# Patient Record
Sex: MALE | Race: WHITE | Employment: FULL TIME | URBAN - METROPOLITAN AREA
[De-identification: names, ages, dates, MRNs, and addresses within clinical notes are randomized per-mention and may not be internally consistent; named-entity substitution may affect disease eponyms.]

---

## 2019-06-21 ENCOUNTER — HOSPITAL ENCOUNTER (EMERGENCY)
Age: 24
Discharge: HOME | End: 2019-06-21
Attending: EMERGENCY MEDICINE
Payer: COMMERCIAL

## 2019-06-21 VITALS
SYSTOLIC BLOOD PRESSURE: 155 MMHG | TEMPERATURE: 98.2 F | OXYGEN SATURATION: 99 % | RESPIRATION RATE: 16 BRPM | BODY MASS INDEX: 29.55 KG/M2 | WEIGHT: 195 LBS | HEIGHT: 68 IN | DIASTOLIC BLOOD PRESSURE: 100 MMHG | HEART RATE: 78 BPM

## 2019-06-21 DIAGNOSIS — H18.891 CORNEAL IRRITATION OF RIGHT EYE: Primary | ICD-10-CM

## 2019-06-21 PROCEDURE — 2580000003 HC RX 258: Performed by: PHYSICIAN ASSISTANT

## 2019-06-21 PROCEDURE — 6370000000 HC RX 637 (ALT 250 FOR IP): Performed by: PHYSICIAN ASSISTANT

## 2019-06-21 PROCEDURE — 99282 EMERGENCY DEPT VISIT SF MDM: CPT

## 2019-06-21 RX ORDER — HYDROCODONE BITARTRATE AND ACETAMINOPHEN 5; 325 MG/1; MG/1
1 TABLET ORAL ONCE
Status: COMPLETED | OUTPATIENT
Start: 2019-06-21 | End: 2019-06-21

## 2019-06-21 RX ORDER — ERYTHROMYCIN 5 MG/G
OINTMENT OPHTHALMIC ONCE
Status: COMPLETED | OUTPATIENT
Start: 2019-06-21 | End: 2019-06-21

## 2019-06-21 RX ORDER — 0.9 % SODIUM CHLORIDE 0.9 %
250 INTRAVENOUS SOLUTION INTRAVENOUS ONCE
Status: COMPLETED | OUTPATIENT
Start: 2019-06-21 | End: 2019-06-21

## 2019-06-21 RX ORDER — TOBRAMYCIN 3 MG/ML
1 SOLUTION/ DROPS OPHTHALMIC ONCE
Status: DISCONTINUED | OUTPATIENT
Start: 2019-06-21 | End: 2019-06-21

## 2019-06-21 RX ORDER — HYDROCODONE BITARTRATE AND ACETAMINOPHEN 5; 325 MG/1; MG/1
1 TABLET ORAL EVERY 8 HOURS PRN
Qty: 6 TABLET | Refills: 0 | Status: SHIPPED | OUTPATIENT
Start: 2019-06-21 | End: 2019-06-23

## 2019-06-21 RX ORDER — TOBRAMYCIN 3 MG/ML
1 SOLUTION/ DROPS OPHTHALMIC ONCE
Status: COMPLETED | OUTPATIENT
Start: 2019-06-21 | End: 2019-06-21

## 2019-06-21 RX ORDER — TETRACAINE HYDROCHLORIDE 5 MG/ML
1 SOLUTION OPHTHALMIC ONCE
Status: COMPLETED | OUTPATIENT
Start: 2019-06-21 | End: 2019-06-21

## 2019-06-21 RX ADMIN — HYDROCODONE BITARTRATE AND ACETAMINOPHEN 1 TABLET: 5; 325 TABLET ORAL at 20:39

## 2019-06-21 RX ADMIN — HYDROCODONE BITARTRATE AND ACETAMINOPHEN 1 TABLET: 5; 325 TABLET ORAL at 21:04

## 2019-06-21 RX ADMIN — TOBRAMYCIN 1 DROP: 3 SOLUTION/ DROPS OPHTHALMIC at 20:45

## 2019-06-21 RX ADMIN — ERYTHROMYCIN: 5 OINTMENT OPHTHALMIC at 20:45

## 2019-06-21 RX ADMIN — FLUORESCEIN SODIUM 1 MG: 0.6 STRIP OPHTHALMIC at 20:09

## 2019-06-21 RX ADMIN — SODIUM CHLORIDE 250 ML: 9 INJECTION, SOLUTION INTRAVENOUS at 20:50

## 2019-06-21 RX ADMIN — TETRACAINE HYDROCHLORIDE 1 DROP: 5 SOLUTION OPHTHALMIC at 20:08

## 2019-06-21 ASSESSMENT — PAIN SCALES - GENERAL
PAINLEVEL_OUTOF10: 8
PAINLEVEL_OUTOF10: 6
PAINLEVEL_OUTOF10: 8
PAINLEVEL_OUTOF10: 6

## 2019-06-21 ASSESSMENT — PAIN DESCRIPTION - ORIENTATION: ORIENTATION: RIGHT

## 2019-06-21 ASSESSMENT — ENCOUNTER SYMPTOMS
SHORTNESS OF BREATH: 0
SORE THROAT: 0
VOMITING: 0
BACK PAIN: 0
ABDOMINAL PAIN: 0
EYE PAIN: 1
EYE REDNESS: 1
COUGH: 0
NAUSEA: 0

## 2019-06-21 ASSESSMENT — PAIN DESCRIPTION - FREQUENCY: FREQUENCY: CONTINUOUS

## 2019-06-21 ASSESSMENT — PAIN DESCRIPTION - LOCATION: LOCATION: EYE

## 2019-06-21 ASSESSMENT — PAIN DESCRIPTION - PAIN TYPE: TYPE: ACUTE PAIN

## 2019-06-21 ASSESSMENT — PAIN DESCRIPTION - DESCRIPTORS: DESCRIPTORS: BURNING

## 2019-06-21 NOTE — ED PROVIDER NOTES
[facility]  [address] [place] [address]  Phone: [phone]  Fax: [phone]      Pt Name: Kaylene Woods  MRN: [id number]  Concha 1995  Date of evaluation: 6/21/2019      CHIEF COMPLAINT       Chief Complaint   Patient presents with    Eye Injury     Injured / scratched right eye while removing his contact       HISTORY OF PRESENT ILLNESS   (Location, Quality, Severity, Duration, Timing, Context, ModifyingFactors, Associated Signs and Symptoms)     Kaylene Woods is a 24 y.o. male who presents to the ER with his father for evaluation of right eye irritation. Patient states that he wears contact lenses. He put in his contact this morning around 10 AM.  He states that throughout the day the eye became gradually more irritated. He ended up removing his contact around 2 PM.  He states that his eye felt very dry and was red. Patient presented to greatly to urgent care and was evaluated. He was diagnosed with allergies and placed on a combination antibiotic/steroid eyedrop. Patient continued to have worsening discomfort so he presented to a second urgent care ([facility] urgent [facility]). Patient states that the eye was numbed and stained. He was diagnosed with a severe corneal abrasion and sent to the ER for further evaluation and treatment. Patient was given the bottle of tetracaine to take with him. The patient and his father were under the impression that they would be calling our facility to inform them that he was coming. Patient states that the right eye has been watery. His vision is blurry. Patient rates his acute right eye pain at 8/10. Patient is established with an optometrist.    Nursing Notes were reviewed. REVIEW OF SYSTEMS     (2-9 systems for level 4, 10 or more for level 5)    Review of Systems   Constitutional: Negative for chills and fever. HENT: Negative for congestion, ear pain and sore throat. Eyes: Positive for pain and redness. Blurry vision - right eye. Tearing - right eye. Respiratory: Negative for cough and shortness of breath. Cardiovascular: Negative for chest pain. Gastrointestinal: Negative for abdominal pain, nausea and vomiting. Genitourinary: Negative for decreased urine volume. Musculoskeletal: Negative for back pain and neck pain. Skin: Negative for rash. Neurological: Negative for seizures and syncope. All other systems reviewed and are negative. PAST MEDICAL HISTORY   Patient denies. SURGICAL HISTORY     Unknown. CURRENT MEDICATIONS     Neomycin-polymyxin B- dexamethasone ophthalmic drops    ALLERGIES     has No Known Allergies. FAMILY HISTORY     Not relevant to the current problem. SOCIAL HISTORY      reports that he has never smoked. He has never used smokeless tobacco. He reports that he drinks alcohol. He reports that he does not use drugs. PHYSICAL EXAM     (7 for level 4, 8 or more for level 5)    ED Triage Vitals [06/21/19 1949]   BP Temp Temp Source Pulse Resp SpO2 Height Weight   (!) 155/100 98.2 °F (36.8 °C) Oral 78 16 99 % 5' 8\" (1.727 m) 195 lb (88.5 kg)     Physical Exam   Constitutional: He appears well-developed and well-nourished. Nontoxic. Mildly uncomfortable. HENT:   Head: Normocephalic and atraumatic. Eyes: Pupils are equal, round, and reactive to light. EOM and lids are normal. Lids are everted and swept, no foreign bodies found. Right conjunctiva is injected. Right conjunctiva has no hemorrhage. Slit lamp exam:       The right eye shows fluorescein uptake. Neck: Normal range of motion. Neck supple. Cardiovascular: Normal rate. Pulmonary/Chest: Effort normal. No respiratory distress. Musculoskeletal:   Normal ambulation. Neurological: He is alert. Grossly intact. Skin: Skin is warm and dry. Psychiatric: He has a normal mood and affect. His behavior is normal.   Vitals reviewed.     DIAGNOSTIC RESULTS     LABS:  No results found for this visit on 06/21/19. MDM:   Patient presents to the ER for evaluation of redness and irritation to the right eye. Patient states that he wears contact lenses. He placed his contacts this morning around 10 AM.  Throughout the day he developed increasing irritation of the right eye. He states his eye felt extremely dry. He removed his contact around 2 PM this afternoon and the eye was red and irritated. He states that he is sensitive to bright light. He presented to urgent care where he was diagnosed with a severe corneal abrasion. He was sent here to the ER for further evaluation and treatment. On exam, the right eye is injected. The eye is tearing. The eye will be anesthetized with tetracaine and stained with fluorescein for further examination. Patient's acute pain will be treated with Norco.    Controlled Substance Monitoring:  Acute and Chronic Pain Monitoring:   RX Monitoring 6/21/2019   Periodic Controlled Substance Monitoring No signs of potential drug abuse or diversion identified. EMERGENCY DEPARTMENT COURSE:   Vitals:    Vitals:    06/21/19 1949   BP: (!) 155/100   Pulse: 78   Resp: 16   Temp: 98.2 °F (36.8 °C)   TempSrc: Oral   SpO2: 99%   Weight: 88.5 kg (195 lb)   Height: 5' 8\" (1.727 m)     -------------------------  BP: (!) 155/100, Temp: 98.2 °F (36.8 °C), Pulse: 78, Resp: 16    The patient was given the following medications:  Orders Placed This Encounter   Medications    tetracaine (TETRAVISC) 0.5 % ophthalmic solution 1 drop    fluorescein ophthalmic strip 1 mg    0.9 % sodium chloride bolus    DISCONTD: tobramycin (TOBREX) 0.3 % ophthalmic solution 1 drop    HYDROcodone-acetaminophen (NORCO) 5-325 MG per tablet 1 tablet    erythromycin (ROMYCIN) ophthalmic ointment    tobramycin (TOBREX) 0.3 % ophthalmic solution 1 drop    HYDROcodone-acetaminophen (NORCO) 5-325 MG per tablet     Sig: Take 1 tablet by mouth every 8 hours as needed for Pain for up to 2 days.      Dispense:  6 tablet     Refill:  0    HYDROcodone-acetaminophen (NORCO) 5-325 MG per tablet 1 tablet      Re-evaluation Notes  The plan of care was discussed with the patient and his father by myself. Patient understands to not use the tetracaine drops or the steroid/antibiotic drops received from the other facilities. Patient will be placed on Tobrex ophthalmic drops and erythromycin ointment. I will prescribe Norco for pain. Patient understands to not place his contacts back in his eyes until he has been evaluated and cleared to do so by either his optometrist or the listed ophthalmologist on his discharge paperwork. FINAL IMPRESSION      1. Corneal irritation of right eye        DISPOSITION/PLAN   DISPOSITION Discharge - Pending Orders Complete 06/21/2019 08:32:38 PM    Condition on Disposition  Stable    PATIENT REFERRED TO:  Yahaira Wick MD  [address] [place] [address]  [phone]    Schedule an appointment as soon as possible for a visit   For reevaluation tomorrow    DISCHARGE MEDICATIONS:  New Prescriptions    HYDROCODONE-ACETAMINOPHEN (NORCO) 5-325 MG PER TABLET    Take 1 tablet by mouth every 8 hours as needed for Pain for up to 2 days.      (Please note that portions of this note were completed with a voice recognition program.  Efforts were made to edit the dictations but occasionally words are mis-transcribed.)    Saqib Herrera PA-C  06/23/19 [id number]

## 2019-06-22 NOTE — ED PROVIDER NOTES
[facility] ED  [address] [address]  Phone: [phone]  Fax: [phone]      Attending Physician Attestation    I performed a history and physical examination of the patient and discussed management with the mid level provideer. I reviewed the mid level provider's note and agree with the documented findings and plan of care. Any areas of disagreement are noted on the chart. I was personally present for the key portions of any procedures. I have documented in the chart those procedures where I was not present during the key portions. I have reviewed the emergency nurses triage note. I agree with the chief complaint, past medical history, past surgical history, allergies, medications, social and family history as documented unless otherwise noted below. Documentation of the HPI, Physical Exam and Medical Decision Making performed by mid level providers is based on my personal performance of the HPI, PE and MDM. For Physician Assistant/ Nurse Practitioner cases/documentation I have personally evaluated this patient and have completed at least one if not all key elements of the E/M (history, physical exam, and MDM). Additional findings are as noted. CHIEF COMPLAINT       Chief Complaint   Patient presents with    Eye Injury     Injured / scratched right eye while removing his contact         PAST MEDICAL HISTORY    has no past medical history on file. SURGICAL HISTORY      has no past surgical history on file. CURRENT MEDICATIONS       Discharge Medication List as of 6/21/2019  8:41 PM          ALLERGIES     has No Known Allergies. FAMILY HISTORY     has no family status information on file. family history is not on file. SOCIAL HISTORY      reports that he has never smoked. He has never used smokeless tobacco. He reports that he drinks alcohol. He reports that he does not use drugs.       DIAGNOSTIC RESULTS     EKG: All EKG's are interpreted by the Emergency Department Physician who either signs or Co-signs this chart in the absence of a cardiologist.      RADIOLOGY:   Non-plain film images such as CT, Ultrasound and MRI are read by the radiologist. Plain radiographic images are visualized and the radiologist interpretations are reviewed as follows: No orders to display       No results found. LABS:  No results found for this visit on 06/21/19. EMERGENCY DEPARTMENT COURSE:   Vitals:    Vitals:    06/21/19 1949   BP: (!) 155/100   Pulse: 78   Resp: 16   Temp: 98.2 °F (36.8 °C)   TempSrc: Oral   SpO2: 99%   Weight: 88.5 kg (195 lb)   Height: 5' 8\" (1.727 m)     -------------------------  BP: (!) 155/100, Temp: 98.2 °F (36.8 °C), Pulse: 78, Resp: 16      PERTINENT ATTENDING PHYSICIAN COMMENTS:    Patient presents with irritation to his right cornea from a contact lens. Was seen at 2 other urgent cares. The first urgent care if him a steroid/antibiotic drop. The second urgent care gave him tetracaine and advised to come here. We did advise that he not use the steroid or tetracaine drops. We will put him on Tobrex drops to cover for pseudomonas from the contact lens potentially. Patient asked for gel to put on his eye which helped last time. I feel this was most likely erythromycin ointment and will give him some of that to help as well. We'll also use hydrocodone to help with his symptoms. He does have a fairly extensive irritation. No periorbital findings. We did advise ophthalmology follow-up as well. Advised to return sooner if worse or for new or concerning symptoms. Visual acuity is within normal limits. I did not appreciate any hyphema/hypopyon. Anterior chambers clear. Rest of ocular exam of any corneal findings are unremarkable. Patient comfortable with the plan. I have reviewed the disposition diagnosis with the patient and or their family/guardian. I have answered their questions and given discharge instructions.   They voiced understanding of these instructions and did not have any further questions or complaints.        (Please note that portions of this note were completed with a voice recognition program.  Efforts were made to edit the dictations but occasionally words are mis-transcribed.)    Nancy Poe,, DO  Attending Emergency Medicine Physician        Nancy Poe,   06/21/19 [id number]